# Patient Record
Sex: MALE | URBAN - METROPOLITAN AREA
[De-identification: names, ages, dates, MRNs, and addresses within clinical notes are randomized per-mention and may not be internally consistent; named-entity substitution may affect disease eponyms.]

---

## 2022-01-01 ENCOUNTER — HOSPITAL ENCOUNTER (OUTPATIENT)
Dept: TELEMEDICINE | Facility: HOSPITAL | Age: 62
Discharge: HOME | End: 2022-01-01
Payer: COMMERCIAL

## 2022-01-01 DIAGNOSIS — I63.412 STROKE DUE TO EMBOLISM OF LEFT MIDDLE CEREBRAL ARTERY: ICD-10-CM

## 2022-01-01 PROCEDURE — 99205 PR OFFICE/OUTPT VISIT, NEW, LEVL V, 60-74 MIN: ICD-10-PCS | Mod: GT,,, | Performed by: PSYCHIATRY & NEUROLOGY

## 2022-01-01 PROCEDURE — 99205 OFFICE O/P NEW HI 60 MIN: CPT | Mod: GT,,, | Performed by: PSYCHIATRY & NEUROLOGY

## 2022-01-02 PROBLEM — I63.412 STROKE DUE TO EMBOLISM OF LEFT MIDDLE CEREBRAL ARTERY: Status: ACTIVE | Noted: 2022-01-02

## 2022-01-02 NOTE — CONSULTS
[facility]  Vascular Neurology  Comprehensive Stroke Center  TeleVascular Neurology Acute Consultation Note      Consults    Consulting Provider: JANE GUARDADO  Current Providers  No providers found    Patient Location: [facility] TELEMEDICINE ED [facility] Emergency Department  [facility] hospital nurse at bedside with patient assisting consultant.     Patient information was obtained from EMS personnel and primary team.         Assessment/Plan:       Diagnoses:   Stroke due to embolism of left middle cerebral artery  Stroke due to embolism of left middle cerebral artery this is a diagnosis that is most likely given the history however given the CT scan findings with to make sure there is no underlying brain met.  Antithrombotics for secondary stroke prevention: Antiplatelets: Aspirin: 81 mg daily  Clopidogrel: 75 mg daily    Statins for secondary stroke prevention and hyperlipidemia, if present:   Statins: Atorvastatin- 80 mg daily    Aggressive risk factor modification: Smoking, Lung cancer and recent COVID     Rehab efforts: The patient has been evaluated by a stroke team provider and the therapy needs have been fully considered based off the presenting complaints and exam findings. The following therapy evaluations are needed: SLP evaluate and treat, PM&R evaluate for appropriate placement    Diagnostics ordered/pending: CTA Head to assess vasculature , CTA Neck/Arch to assess vasculature, Lipid Profile to assess cholesterol levels, MRI head without contrast to assess brain parenchyma, TTE to assess cardiac function/status     VTE prophylaxis: None: Reason for No Pharmacological VTE Prophylaxis: Ambulating with or without assistance    BP parameters: Infarct: No intervention, SBP <220            STROKE DOCUMENTATION     Acute Stroke Times:   Acute Stroke Times   Last Known Normal Date: 01/01/22  Last Known Normal Time: 1645  Symptom Onset Date:  01/01/22  Symptom Onset Time: 1645  Stroke Team Called Date: 01/01/22  Stroke Team Called Time: 1729  Stroke Team Arrival Date: 01/01/22  Stroke Team Arrival Time: 1733  CT Interpretation Time: 1733  Alteplase Recommended: No  Thrombectomy Recommended: No    NIH Scale:  Interval: baseline  1a. Level of Consciousness: 0-->Alert, keenly responsive  1b. LOC Questions: 2-->Answers neither question correctly  1c. LOC Commands: 2-->Performs neither task correctly  2. Best Gaze: 0-->Normal  3. Visual: 0-->No visual loss  4. Facial Palsy: 2-->Partial paralysis (total or near-total paralysis of lower face)  5a. Motor Arm, Left: 0-->No drift, limb holds 90 (or 45) degrees for full 10 secs  5b. Motor Arm, Right: 0-->No drift, limb holds 90 (or 45) degrees for full 10 secs  6a. Motor Leg, Left: 0-->No drift, leg holds 30 degree position for full 5 secs  6b. Motor Leg, Right: 0-->No drift, leg holds 30 degree position for full 5 secs  7. Limb Ataxia: 0-->Absent  8. Sensory: 0-->Normal, no sensory loss  9. Best Language: 3-->Mute, global aphasia, no usable speech or auditory comprehension  10. Dysarthria: 2-->Severe dysarthria, patients speech is so slurred as to be unintelligible in the absence of or out of proportion to any dysphasia, or is mute/anarthric  11. Extinction and Inattention (formerly Neglect): 0-->No abnormality  Total (NIH Stroke Scale): 11     Modified [place]    [place] Coma Scale:    ABCD2 Score:    [id number] Score:   HAS -BLED Score:   ICH Score:   Hunt & Gupta Classification:       There were no vitals taken for this visit.  Alteplase Eligible?: Yes  Alteplase Recommendation: Alteplase not recommended due to Intracranial neoplasm  and Other   Possible Interventional Revascularization Candidate? Awaiting CTA results from [facility] for determination     Disposition Recommendation: transfer to nearest appropriate facility     Subjective:     History of Present Illness:  This is a 61-year-old male who was last known  normal at 16 46 when developed receptive and expressive aphasia.  Patient has a known history of lung cancer and chest x-ray revealed a new right-sided mass with lymphadenopathy.        Woke up with symptoms?: no    Recent bleeding noted: no  Does the patient take any Blood Thinners? no  Medications: No relevant medications      Past Medical History: Cancer and COVID-19 History    Past Surgical History: no major surgeries within the last 2 weeks    Family History: no relevant history    Social History: smoker (active)    Allergies: Allergies have not been reviewed     Review of Systems   Unable to perform ROS: Patient nonverbal     Objective:   Vitals:  BP: 147/107 and Heart Rate: 85    CT READ: Yes  Abnormal CT There is a hypodense area in the left posterior parietal region without any mass effect.  Given the patient's history of lung cancer with possible recurrence, MRI to be performed to rule out mass..     Physical Exam  Vitals reviewed.   Constitutional:       Appearance: Normal appearance. He is well-developed.   HENT:      Head: Normocephalic and atraumatic.      Nose: Nose normal.   Eyes:      Pupils: Pupils are equal, round, and reactive to light.   Cardiovascular:      Rate and Rhythm: Normal rate and regular rhythm.   Pulmonary:      Effort: Pulmonary effort is normal.   Neurological:      Mental Status: He is alert and oriented to person, place, and time.      Cranial Nerves: Cranial nerve deficit and facial asymmetry present.      Sensory: No sensory deficit.      Motor: No weakness.      Comments: Global aphasia   Psychiatric:         Mood and Affect: Mood normal.               Recommended the emergency room physician to have a brief discussion with the patient and/or family if available regarding the risks and benefits of treatment, and to briefly document the occurrence of that discussion in his clinical encounter note.     The attending portion of this evaluation, treatment, and documentation was  performed per Jojo Luciano MD via audiovisual.    Billing code:  (moderate to severe stroke, large areas of edema, some mimics)    · This patient has a critical neurological condition/illness, with high morbidity and mortality.  · There is a high probability for acute neurological change leading to clinical and possibly life-threatening deterioration requiring highest level of physician preparedness for urgent intervention.  · Care was coordinated with other physicians involved in the patient's care.  · Radiologic studies and laboratory data were reviewed and interpreted, and plan of care was re-assessed based on the results.  · Diagnosis, treatment options and prognosis may have been discussed with the patient and/or family members or caregiver.  · Further advanced medical management and further evaluation is warranted for his care.      In your opinion, this was a: Tier 1 Van Positive    Consult End Time: [id number]     Jojo Luciano MD  Comprehensive Stroke Center  Vascular Neurology   [facility]

## 2022-01-02 NOTE — HPI
This is a 61-year-old male who was last known normal at [age] when developed receptive and expressive aphasia.  Patient has a known history of lung cancer and chest x-ray revealed a new right-sided mass with lymphadenopathy.

## 2022-01-02 NOTE — ASSESSMENT & PLAN NOTE
Stroke due to embolism of left middle cerebral artery this is a diagnosis that is most likely given the history however given the CT scan findings with to make sure there is no underlying brain met.  Antithrombotics for secondary stroke prevention: Antiplatelets: Aspirin: 81 mg daily  Clopidogrel: 75 mg daily    Statins for secondary stroke prevention and hyperlipidemia, if present:   Statins: Atorvastatin- 80 mg daily    Aggressive risk factor modification: Smoking, Lung cancer and recent COVID     Rehab efforts: The patient has been evaluated by a stroke team provider and the therapy needs have been fully considered based off the presenting complaints and exam findings. The following therapy evaluations are needed: SLP evaluate and treat, PM&R evaluate for appropriate placement    Diagnostics ordered/pending: CTA Head to assess vasculature , CTA Neck/Arch to assess vasculature, Lipid Profile to assess cholesterol levels, MRI head without contrast to assess brain parenchyma, TTE to assess cardiac function/status     VTE prophylaxis: None: Reason for No Pharmacological VTE Prophylaxis: Ambulating with or without assistance    BP parameters: Infarct: No intervention, SBP <220

## 2022-05-30 ENCOUNTER — OUTSIDE PLACE OF SERVICE (OUTPATIENT)
Dept: NEUROLOGY | Facility: CLINIC | Age: 62
End: 2022-05-30
Payer: COMMERCIAL

## 2022-05-30 DIAGNOSIS — R41.82 AMS (ALTERED MENTAL STATUS): ICD-10-CM

## 2022-05-30 PROCEDURE — 99223 1ST HOSP IP/OBS HIGH 75: CPT | Mod: S$GLB,,, | Performed by: SPECIALIST

## 2022-05-30 PROCEDURE — 99223 PR INITIAL HOSPITAL CARE,LEVL III: ICD-10-PCS | Mod: S$GLB,,, | Performed by: SPECIALIST

## 2022-06-01 PROCEDURE — 99231 SBSQ HOSP IP/OBS SF/LOW 25: CPT | Mod: S$GLB,,, | Performed by: SPECIALIST

## 2022-06-01 PROCEDURE — 99231 PR SUBSEQUENT HOSPITAL CARE,LEVL I: ICD-10-PCS | Mod: S$GLB,,, | Performed by: SPECIALIST
